# Patient Record
Sex: FEMALE | Race: WHITE | NOT HISPANIC OR LATINO | Employment: FULL TIME | URBAN - METROPOLITAN AREA
[De-identification: names, ages, dates, MRNs, and addresses within clinical notes are randomized per-mention and may not be internally consistent; named-entity substitution may affect disease eponyms.]

---

## 2021-02-18 ENCOUNTER — OUTSIDE FACILITY SERVICE (OUTPATIENT)
Dept: CARDIOLOGY | Facility: CLINIC | Age: 51
End: 2021-02-18

## 2021-02-18 PROCEDURE — 93227 XTRNL ECG REC<48 HR R&I: CPT | Performed by: INTERNAL MEDICINE

## 2025-01-29 ENCOUNTER — OFFICE VISIT (OUTPATIENT)
Dept: CARDIOLOGY | Facility: CLINIC | Age: 55
End: 2025-01-29
Payer: COMMERCIAL

## 2025-01-29 VITALS
OXYGEN SATURATION: 97 % | DIASTOLIC BLOOD PRESSURE: 63 MMHG | SYSTOLIC BLOOD PRESSURE: 123 MMHG | BODY MASS INDEX: 29.43 KG/M2 | WEIGHT: 146 LBS | HEART RATE: 73 BPM | HEIGHT: 59 IN

## 2025-01-29 DIAGNOSIS — R55 SYNCOPE AND COLLAPSE: Primary | ICD-10-CM

## 2025-01-29 DIAGNOSIS — R00.2 PALPITATIONS: ICD-10-CM

## 2025-01-29 RX ORDER — PANTOPRAZOLE SODIUM 40 MG/1
1 TABLET, DELAYED RELEASE ORAL DAILY
COMMUNITY

## 2025-01-29 RX ORDER — DICYCLOMINE HYDROCHLORIDE 10 MG/1
1 CAPSULE ORAL DAILY
COMMUNITY

## 2025-01-29 RX ORDER — LOSARTAN POTASSIUM 100 MG/1
1 TABLET ORAL DAILY
COMMUNITY

## 2025-01-29 RX ORDER — AMLODIPINE BESYLATE 5 MG/1
1 TABLET ORAL DAILY
COMMUNITY
End: 2025-01-29

## 2025-01-29 RX ORDER — FLUTICASONE PROPIONATE 50 MCG
2 SPRAY, SUSPENSION (ML) NASAL DAILY
COMMUNITY
Start: 2025-01-07

## 2025-01-29 RX ORDER — LEVOTHYROXINE SODIUM 25 UG/1
1 TABLET ORAL DAILY
COMMUNITY

## 2025-01-29 RX ORDER — OMEPRAZOLE 40 MG/1
1 CAPSULE, DELAYED RELEASE ORAL DAILY
COMMUNITY

## 2025-01-29 RX ORDER — ATENOLOL 25 MG/1
25 TABLET ORAL DAILY
Qty: 90 TABLET | Refills: 3 | Status: SHIPPED | OUTPATIENT
Start: 2025-01-29

## 2025-01-29 RX ORDER — HYDROCHLOROTHIAZIDE 50 MG/1
1 TABLET ORAL DAILY
COMMUNITY

## 2025-01-29 NOTE — PROGRESS NOTES
HP      Name: Layne Michaels ADMIT: (Not on file)   : 1970  PCP: Yossi Jo MD    MRN: [id number] LOS: 0 days   AGE/SEX: 54 y.o. female  ROOM: Room/bed info not found     Chief Complaint   Patient presents with    Consult     Palpitations, syncope       Subjective        History of present illness  Layne Michaels is a 64-year-old female patient, no prior history of coronary artery disease, yesterday for evaluation of palpitations and tachycardia.  Patient says that she has had fast heart rate for several years, most of the time in the 90s when she checks it at home.  She denies any exertional chest pain or shortness of breath, however sometimes does have heart rate becomes uncomfortable.    Past Medical History:   Diagnosis Date    Hypertension     Sleep apnea      History reviewed. No pertinent surgical history.  Family History   Problem Relation Age of Onset    Heart failure Mother     Kidney cancer Mother     Bradycardia Mother     Other (mouth cancer) Father     Diabetes Father      Social History     Tobacco Use    Smoking status: Never     Passive exposure: Past    Smokeless tobacco: Never   Vaping Use    Vaping status: Never Used   Substance Use Topics    Alcohol use: Never    Drug use: Never     (Not in a hospital admission)    Allergies:  Etodolac, Bisoprolol, Gabapentin, Bupropion, Citalopram, Penicillins, and Sertraline    Review of systems    Constitutional: Negative.    Respiratory and cardiovascular: As detailed in HPI section.  Gastrointestinal: Negative for constipation, nausea and vomiting negative for abdominal distention, abdominal pain and diarrhea.   Genitourinary: Negative for difficulty urinating and flank pain.   Musculoskeletal: Negative for arthralgias, joint swelling and myalgias.   Skin: Negative for color change, rash and wound.   Neurological: Negative for dizziness, syncope, weakness and headaches.   Hematological: Negative for adenopathy.   Psychiatric/Behavioral:  Negative for confusion.   All other systems reviewed and are negative.    Physical Exam  VITALS REVIEWED    General:      well developed, in no acute distress.    Head:      normocephalic and atraumatic.    Eyes:      PERRL/EOM intact, conjunctiva and sclera clear with out nystagmus.    Neck:      no masses, thyromegaly,  trachea central with normal respiratory effort and PMI displaced laterally  Lungs:      Clear to auscultation bilaterally  Heart:       Regular rate and rhythm, no murmur  Msk:      no deformity or scoliosis noted of thoracic or lumbar spine.    Pulses:      pulses normal in all 4 extremities.    Extremities:       No lower extremity edema  Neurologic:      no focal deficits.   alert oriented x3  Skin:      intact without lesions or rashes.    Psych:      alert and cooperative; normal mood and affect; normal attention span and concentration.      Result Review :               Pertinent cardiac workup    EKG 1/29/2025 normal sinus rhythm  Holter monitor 2/8/2021 normal.      Procedures        Assessment and Plan      Layne Michaels is a 54-year-old female patient who has no history of coronary artery disease or other cardiac issues, is here today for evaluation of rapid heartbeat.  It seems like she does have elevated baseline heart rate.  Her EKG today shows sinus rhythm, previous Holter monitor was normal.  I do not suspect SVT or A-fib based on her symptoms.  I think this probably best to switch amlodipine over to atenolol to see if that lowers her baseline heart rate to more acceptable level.  If that does not help, then we can perform another monitor study to reevaluate.  I do not think she needs ischemic workup at this time since she is not experiencing any chest pain.    Diagnoses and all orders for this visit:    1. Syncope and collapse [R55] (Primary)    2. Palpitations [R00.2]    Other orders  -     atenolol (TENORMIN) 25 MG tablet; Take 1 tablet by mouth Daily.  Dispense: 90 tablet;  Refill: 3           No follow-ups on file.  Patient was given instructions and counseling regarding her condition or for health maintenance advice. Please see specific information pulled into the AVS if appropriate.     Electronically signed by Ricardo Carpenter MD, 01/29/25, 9:27 AM EST.